# Patient Record
Sex: FEMALE | Race: WHITE | NOT HISPANIC OR LATINO | Employment: PART TIME | URBAN - METROPOLITAN AREA
[De-identification: names, ages, dates, MRNs, and addresses within clinical notes are randomized per-mention and may not be internally consistent; named-entity substitution may affect disease eponyms.]

---

## 2018-06-12 ENCOUNTER — PATIENT MESSAGE (OUTPATIENT)
Dept: ADMINISTRATIVE | Facility: OTHER | Age: 48
End: 2018-06-12

## 2018-06-14 ENCOUNTER — OFFICE VISIT (OUTPATIENT)
Dept: OBSTETRICS AND GYNECOLOGY | Facility: CLINIC | Age: 48
End: 2018-06-14
Payer: COMMERCIAL

## 2018-06-14 VITALS
HEIGHT: 63 IN | WEIGHT: 226.19 LBS | DIASTOLIC BLOOD PRESSURE: 70 MMHG | BODY MASS INDEX: 40.08 KG/M2 | SYSTOLIC BLOOD PRESSURE: 108 MMHG

## 2018-06-14 DIAGNOSIS — Z01.419 WELL WOMAN EXAM WITH ROUTINE GYNECOLOGICAL EXAM: Primary | ICD-10-CM

## 2018-06-14 PROCEDURE — 99999 PR PBB SHADOW E&M-EST. PATIENT-LVL II: CPT | Mod: PBBFAC,,, | Performed by: OBSTETRICS & GYNECOLOGY

## 2018-06-14 PROCEDURE — 99396 PREV VISIT EST AGE 40-64: CPT | Mod: S$GLB,,, | Performed by: OBSTETRICS & GYNECOLOGY

## 2018-06-14 PROCEDURE — 88175 CYTOPATH C/V AUTO FLUID REDO: CPT

## 2018-06-14 RX ORDER — FUROSEMIDE 40 MG/1
TABLET ORAL
COMMUNITY
Start: 2018-05-03 | End: 2020-01-15

## 2018-06-14 RX ORDER — BUPROPION HYDROCHLORIDE 300 MG/1
TABLET ORAL
COMMUNITY
Start: 2018-05-14 | End: 2020-01-15

## 2018-06-14 RX ORDER — METFORMIN HYDROCHLORIDE 500 MG/1
500 TABLET, EXTENDED RELEASE ORAL 2 TIMES DAILY
COMMUNITY
Start: 2018-05-14

## 2018-06-14 RX ORDER — DULOXETIN HYDROCHLORIDE 60 MG/1
CAPSULE, DELAYED RELEASE ORAL
COMMUNITY
Start: 2018-05-14 | End: 2020-01-15

## 2018-06-14 NOTE — PROGRESS NOTES
CHIEF COMPLAINT:   Gynecologic Exam  Chief Complaint   Patient presents with    Well Woman       HISTORY OF PRESENT ILLNESS  Patient presents for annual exam. The patient has no complaints today.   She is sexually active.  Contraception: vasectomy  Brother recently diagnosed with kidney cancer.     No LMP recorded (lmp unknown). Patient has had an ablation.    GYN screening history: last Pap: was normal.     Health Maintenance   Topic Date Due    Lipid Panel  1969    TETANUS VACCINE  04/02/1987    Mammogram  08/24/2017    Pap Smear with HPV Cotest  08/13/2018    Influenza Vaccine  08/01/2018       HISTORY  Patient Active Problem List   Diagnosis    Anemia, blood loss    Cysts of both ovaries    Menorrhagia       Past Medical History:   Diagnosis Date    Anemia     Diabetes mellitus     Hypertension        Past Surgical History:   Procedure Laterality Date    WISDOM TOOTH EXTRACTION         Family History   Problem Relation Age of Onset    Colon cancer Father     Cancer Brother         kidney    Breast cancer Neg Hx     Ovarian cancer Neg Hx        Social History     Social History    Marital status:      Spouse name: N/A    Number of children: N/A    Years of education: N/A     Social History Main Topics    Smoking status: Never Smoker    Smokeless tobacco: None    Alcohol use 0.0 oz/week      Comment: socially No for 72h prior to surgery    Drug use: No    Sexual activity: Yes     Partners: Male     Birth control/ protection: See Surgical Hx      Comment: vasectomy     Other Topics Concern    None     Social History Narrative    None       Current Outpatient Prescriptions   Medication Sig Dispense Refill    amlodipine (NORVASC) 5 MG tablet Take 5 mg by mouth once daily.      atorvastatin (LIPITOR) 20 MG tablet Take 20 mg by mouth every evening. Take 1 tablet by mouth  daily      buPROPion (WELLBUTRIN XL) 300 MG 24 hr tablet       DULoxetine (CYMBALTA) 60 MG capsule        furosemide (LASIX) 40 MG tablet       irbesartan (AVAPRO) 300 MG tablet Take 300 mg by mouth.      metFORMIN (GLUCOPHAGE-XR) 500 MG 24 hr tablet       metformin (GLUMETZA) 1000 MG (MOD) 24 hr tablet Take 1,000 mg by mouth 2 (two) times daily with meals.       metoprolol succinate (TOPROL-XL) 100 MG 24 hr tablet Take 100 mg by mouth every evening.       omeprazole (PRILOSEC) 40 MG capsule Take 40 mg by mouth 2 (two) times daily before meals.        No current facility-administered medications for this visit.        Review of patient's allergies indicates:  No Known Allergies        PHYSICAL EXAM     Vitals:    06/14/18 0859   BP: 108/70       PAIN SCALE: 0/10 None    ROS:  GENERAL: No fever, chills, fatigability or weight loss.  ABDOMEN: Appetite fine. No weight loss. Denies diarrhea, abdominal pain, hematemesis or blood in stool.  No change in bowel movement pattern.  URINARY: No flank pain, dysuria or hematuria.  REPRODUCTIVE: No abnormal vaginal bleeding.  BREASTS: Breasts symmetric, nontender and no lumps detected.    PE:   APPEARANCE: Well nourished, well developed, in no acute distress.  NECK: Neck symmetric without masses or thyromegaly.    NODES: No inguinal lymph node enlargement.  ABDOMEN: Soft. No tenderness or masses. No hepatosplenomegaly. No hernias.  BREASTS: Symmetrical, no skin changes or visible lesions. No palpable masses, nipple discharge or adenopathy bilaterally.    PELVIC:   VULVA: No lesions. Normal female genitalia.  URETHRAL MEATUS: Normal size and location, no lesions, no prolapse.  URETHRA: No masses, tenderness, prolapse or scarring.  VAGINA: Moist and well rugated, no discharge, no significant cystocele or rectocele.  CERVIX: No lesions, normal diameter, no stenosis, no cervical motion tenderness.  UTERUS: 8 week size, regular shape, mobile, non-tender, normal position, good support.  ADNEXA: No masses, tenderness or CDS nodularity.  ANUS PERINEUM: No lesions, no relaxation,  external hemorrhoids noted.         DIAGNOSIS:   1. Normal gyn exam  2. Screening pap smear      PLAN:   Mammogram     MEDICATIONS PRESCRIBED:   mvi    COUNSELING:  Patient was counseled today on A.C.S. Pap guidelines and recommendations for yearly pelvic exams, mammograms and monthly self breast exams; to see her PCP for other health maintenance.     FOLLOW-UP: With me in 1 year. Pap smear every 3 years.

## 2018-07-02 ENCOUNTER — HOSPITAL ENCOUNTER (OUTPATIENT)
Dept: RADIOLOGY | Facility: HOSPITAL | Age: 48
Discharge: HOME | End: 2018-07-02
Attending: OBSTETRICS & GYNECOLOGY
Payer: COMMERCIAL

## 2018-07-02 DIAGNOSIS — Z01.419 WELL WOMAN EXAM WITH ROUTINE GYNECOLOGICAL EXAM: ICD-10-CM

## 2018-07-02 PROCEDURE — 77063 BREAST TOMOSYNTHESIS BI: CPT | Mod: TC,PO

## 2018-07-02 PROCEDURE — 77067 SCR MAMMO BI INCL CAD: CPT | Mod: 26,,, | Performed by: RADIOLOGY

## 2018-07-02 PROCEDURE — 77063 BREAST TOMOSYNTHESIS BI: CPT | Mod: 26,,, | Performed by: RADIOLOGY

## 2019-09-03 ENCOUNTER — PATIENT MESSAGE (OUTPATIENT)
Dept: OBSTETRICS AND GYNECOLOGY | Facility: CLINIC | Age: 49
End: 2019-09-03

## 2019-09-03 DIAGNOSIS — Z12.39 BREAST CANCER SCREENING: Primary | ICD-10-CM

## 2019-10-03 ENCOUNTER — HOSPITAL ENCOUNTER (OUTPATIENT)
Dept: RADIOLOGY | Facility: HOSPITAL | Age: 49
Discharge: HOME | End: 2019-10-03
Attending: OBSTETRICS & GYNECOLOGY
Payer: COMMERCIAL

## 2019-10-03 VITALS — BODY MASS INDEX: 40.08 KG/M2 | HEIGHT: 63 IN | WEIGHT: 226.19 LBS

## 2019-10-03 DIAGNOSIS — Z12.39 BREAST CANCER SCREENING: ICD-10-CM

## 2019-10-03 PROCEDURE — 77067 SCR MAMMO BI INCL CAD: CPT | Mod: 26,,, | Performed by: RADIOLOGY

## 2019-10-03 PROCEDURE — 77063 BREAST TOMOSYNTHESIS BI: CPT | Mod: TC

## 2019-10-03 PROCEDURE — 77063 MAMMO DIGITAL SCREENING BILAT WITH TOMOSYNTHESIS_CAD: ICD-10-PCS | Mod: 26,,, | Performed by: RADIOLOGY

## 2019-10-03 PROCEDURE — 77063 BREAST TOMOSYNTHESIS BI: CPT | Mod: 26,,, | Performed by: RADIOLOGY

## 2019-10-03 PROCEDURE — 77067 MAMMO DIGITAL SCREENING BILAT WITH TOMOSYNTHESIS_CAD: ICD-10-PCS | Mod: 26,,, | Performed by: RADIOLOGY

## 2019-10-31 ENCOUNTER — OFFICE VISIT (OUTPATIENT)
Dept: OBSTETRICS AND GYNECOLOGY | Facility: CLINIC | Age: 49
End: 2019-10-31
Payer: COMMERCIAL

## 2019-10-31 VITALS
SYSTOLIC BLOOD PRESSURE: 118 MMHG | WEIGHT: 237 LBS | BODY MASS INDEX: 41.99 KG/M2 | DIASTOLIC BLOOD PRESSURE: 78 MMHG | HEIGHT: 63 IN

## 2019-10-31 DIAGNOSIS — Z01.419 WELL WOMAN EXAM WITH ROUTINE GYNECOLOGICAL EXAM: Primary | ICD-10-CM

## 2019-10-31 PROCEDURE — 99396 PREV VISIT EST AGE 40-64: CPT | Mod: S$GLB,,, | Performed by: OBSTETRICS & GYNECOLOGY

## 2019-10-31 PROCEDURE — 99999 PR PBB SHADOW E&M-EST. PATIENT-LVL II: ICD-10-PCS | Mod: PBBFAC,,, | Performed by: OBSTETRICS & GYNECOLOGY

## 2019-10-31 PROCEDURE — 99396 PR PREVENTIVE VISIT,EST,40-64: ICD-10-PCS | Mod: S$GLB,,, | Performed by: OBSTETRICS & GYNECOLOGY

## 2019-10-31 PROCEDURE — 99999 PR PBB SHADOW E&M-EST. PATIENT-LVL II: CPT | Mod: PBBFAC,,, | Performed by: OBSTETRICS & GYNECOLOGY

## 2019-10-31 RX ORDER — OMEPRAZOLE 40 MG/1
40 CAPSULE, DELAYED RELEASE ORAL
COMMUNITY
Start: 2019-07-01 | End: 2019-10-31 | Stop reason: SDUPTHER

## 2019-10-31 RX ORDER — ATORVASTATIN CALCIUM 20 MG/1
20 TABLET, FILM COATED ORAL
COMMUNITY
Start: 2019-07-01 | End: 2019-10-31 | Stop reason: SDUPTHER

## 2019-10-31 RX ORDER — LANCETS
EACH MISCELLANEOUS
COMMUNITY
Start: 2019-04-02

## 2019-10-31 RX ORDER — IRBESARTAN 300 MG/1
TABLET ORAL
COMMUNITY
Start: 2019-08-23 | End: 2019-10-31 | Stop reason: SDUPTHER

## 2019-10-31 RX ORDER — METFORMIN HYDROCHLORIDE 500 MG/1
TABLET, EXTENDED RELEASE ORAL
COMMUNITY
Start: 2019-10-28 | End: 2019-10-31 | Stop reason: SDUPTHER

## 2019-10-31 RX ORDER — BUPROPION HYDROCHLORIDE 300 MG/1
300 TABLET ORAL
COMMUNITY
Start: 2019-07-29 | End: 2019-10-31 | Stop reason: SDUPTHER

## 2019-10-31 RX ORDER — IRBESARTAN 300 MG/1
300 TABLET ORAL
COMMUNITY
Start: 2019-02-18 | End: 2020-01-15

## 2019-10-31 RX ORDER — FUROSEMIDE 40 MG/1
40 TABLET ORAL
COMMUNITY
Start: 2019-07-29 | End: 2019-10-31 | Stop reason: SDUPTHER

## 2019-10-31 RX ORDER — DULOXETIN HYDROCHLORIDE 60 MG/1
60 CAPSULE, DELAYED RELEASE ORAL
COMMUNITY
Start: 2019-07-01 | End: 2019-10-31 | Stop reason: SDUPTHER

## 2019-10-31 RX ORDER — AMLODIPINE BESYLATE 5 MG/1
5 TABLET ORAL
COMMUNITY
Start: 2019-07-08 | End: 2019-10-31 | Stop reason: SDUPTHER

## 2019-10-31 RX ORDER — METOPROLOL SUCCINATE 100 MG/1
100 TABLET, EXTENDED RELEASE ORAL
COMMUNITY
Start: 2019-07-11 | End: 2019-10-31 | Stop reason: SDUPTHER

## 2019-10-31 RX ORDER — DEXTROSE 4 G
TABLET,CHEWABLE ORAL
COMMUNITY
Start: 2019-01-16

## 2019-10-31 NOTE — PROGRESS NOTES
CHIEF COMPLAINT:   Gynecologic Exam  No chief complaint on file.      HISTORY OF PRESENT ILLNESS  Ania Liao   presents for annual exam. The patient has no complaints today.   She is sexually active.  Contraception: vasectomy  Brother diagnosed with kidney cancer last yr.  .     No LMP recorded. Patient has had an ablation.   has occ spotting.     GYN screening history: last Pap: was normal. Never had any abnormal Pap smears in past.     Reports no bowel movement irregularities from baseline, bloating, or weight loss.   HRT:   None       Health Maintenance   Topic Date Due    TETANUS VACCINE  1987    Colonoscopy  2019    Pap Smear with HPV Cotest  2021    Mammogram  10/03/2021    Lipid Panel  2024       HISTORY  Patient Active Problem List   Diagnosis    Anemia, blood loss    Cysts of both ovaries    Menorrhagia       Past Medical History:   Diagnosis Date    Anemia     Diabetes mellitus     Hypertension        Past Surgical History:   Procedure Laterality Date    WISDOM TOOTH EXTRACTION         Family History   Problem Relation Age of Onset    Colon cancer Father     Cancer Brother         kidney    Breast cancer Neg Hx     Ovarian cancer Neg Hx        Social History     Socioeconomic History    Marital status:      Spouse name: Not on file    Number of children: Not on file    Years of education: Not on file    Highest education level: Not on file   Occupational History    Not on file   Social Needs    Financial resource strain: Not on file    Food insecurity:     Worry: Not on file     Inability: Not on file    Transportation needs:     Medical: Not on file     Non-medical: Not on file   Tobacco Use    Smoking status: Never Smoker   Substance and Sexual Activity    Alcohol use: Yes     Alcohol/week: 0.0 standard drinks     Comment: socially No for 72h prior to surgery    Drug use: No    Sexual activity: Yes     Partners: Male      Birth control/protection: See Surgical Hx     Comment: vasectomy   Lifestyle    Physical activity:     Days per week: Not on file     Minutes per session: Not on file    Stress: Not on file   Relationships    Social connections:     Talks on phone: Not on file     Gets together: Not on file     Attends Confucianism service: Not on file     Active member of club or organization: Not on file     Attends meetings of clubs or organizations: Not on file     Relationship status: Not on file   Other Topics Concern    Not on file   Social History Narrative    Not on file       Current Outpatient Medications   Medication Sig Dispense Refill    amlodipine (NORVASC) 5 MG tablet Take 5 mg by mouth once daily.      atorvastatin (LIPITOR) 20 MG tablet Take 20 mg by mouth every evening. Take 1 tablet by mouth  daily      blood-glucose meter Misc Test daily      buPROPion (WELLBUTRIN XL) 300 MG 24 hr tablet       DULoxetine (CYMBALTA) 60 MG capsule       flu vac [id number] 36mos up,PF, 60 mcg (15 mcg x 4)/0.5 mL Syrg Inject into the muscle once 0.5 mL 0    furosemide (LASIX) 40 MG tablet       irbesartan (AVAPRO) 300 MG tablet Take 300 mg by mouth.      lancets Misc Check blood sugar once a day      metFORMIN (GLUCOPHAGE-XR) 500 MG 24 hr tablet       metformin (GLUMETZA) 1000 MG (MOD) 24 hr tablet Take 1,000 mg by mouth 2 (two) times daily with meals.       metoprolol succinate (TOPROL-XL) 100 MG 24 hr tablet Take 100 mg by mouth every evening.       omeprazole (PRILOSEC) 40 MG capsule Take 40 mg by mouth 2 (two) times daily before meals.        No current facility-administered medications for this visit.        Review of patient's allergies indicates:  No Known Allergies        PHYSICAL EXAM     Vitals:    10/31/19 0845   BP: 118/78       PAIN SCALE: 0/10 None    ROS:  GENERAL: No fever, chills, fatigability or weight loss.  ABDOMEN: Appetite fine. No weight loss. Denies diarrhea, abdominal pain, hematemesis or blood  in stool.  No change in bowel movement pattern.  URINARY: No flank pain, dysuria or hematuria.  REPRODUCTIVE: No abnormal vaginal bleeding.  BREASTS: Breasts symmetric, nontender and no lumps detected.    PE:   APPEARANCE: Well nourished, well developed, in no acute distress.  NECK: Neck symmetric without masses or thyromegaly.     NODES: No inguinal lymph node enlargement.  ABDOMEN: Soft. No tenderness or masses. No hepatosplenomegaly. No hernias.  BREASTS: Symmetrical, no skin changes or visible lesions. No palpable masses, nipple discharge or adenopathy bilaterally.    PELVIC:   VULVA: No lesions. Normal female genitalia.  URETHRAL MEATUS: Normal size and location, no lesions, no prolapse.  URETHRA: No masses, tenderness, prolapse or scarring.  VAGINA: Moist and well rugated, no discharge, no significant cystocele or rectocele.  CERVIX: No lesions, normal diameter, no stenosis, no cervical motion tenderness.  UTERUS: 8 week size, regular shape, mobile, non-tender, normal position, good support.  ADNEXA: No masses, tenderness or CDS nodularity.  ANUS PERINEUM: No lesions, no relaxation, external hemorrhoids noted.       DIAGNOSIS:   1. Normal gyn exam  2. Screening pap smear      PLAN:   Mammogram    Colonoscopy      MEDICATIONS PRESCRIBED:   Calcium vitamin D and weight bearing exercise    COUNSELING:  Patient was counseled today on A.C.S. Pap guidelines and recommendations for yearly pelvic exams, mammograms and monthly self breast exams; to see her PCP for other health maintenance.   Pt counseled on signs and symptoms of cancer of the pelvic organs including ovarian and uterine, and to alert myself and my office if pt has any vaginal bleeding, pelvic/abdominal pain, persistent bloating, unexplained constipation, unexplained appetite and/or weight loss.     FOLLOW-UP: With me in 1 year. Pap smear every 3 years.

## 2019-11-12 ENCOUNTER — OFFICE VISIT (OUTPATIENT)
Dept: DERMATOLOGY | Facility: CLINIC | Age: 49
End: 2019-11-12
Payer: COMMERCIAL

## 2019-11-12 DIAGNOSIS — L82.1 SEBORRHEIC KERATOSES: ICD-10-CM

## 2019-11-12 DIAGNOSIS — D18.01 ANGIOMA OF SKIN: ICD-10-CM

## 2019-11-12 DIAGNOSIS — L82.0 INFLAMED SEBORRHEIC KERATOSIS: Primary | ICD-10-CM

## 2019-11-12 DIAGNOSIS — Z12.83 SCREENING, MALIGNANT NEOPLASM, SKIN: ICD-10-CM

## 2019-11-12 PROCEDURE — 99202 PR OFFICE/OUTPT VISIT, NEW, LEVL II, 15-29 MIN: ICD-10-PCS | Mod: 25,S$GLB,, | Performed by: STUDENT IN AN ORGANIZED HEALTH CARE EDUCATION/TRAINING PROGRAM

## 2019-11-12 PROCEDURE — 17110 DESTRUCTION B9 LES UP TO 14: CPT | Mod: S$GLB,,, | Performed by: STUDENT IN AN ORGANIZED HEALTH CARE EDUCATION/TRAINING PROGRAM

## 2019-11-12 PROCEDURE — 17110 PR DESTRUCTION BENIGN LESIONS UP TO 14: ICD-10-PCS | Mod: S$GLB,,, | Performed by: STUDENT IN AN ORGANIZED HEALTH CARE EDUCATION/TRAINING PROGRAM

## 2019-11-12 PROCEDURE — 99999 PR PBB SHADOW E&M-EST. PATIENT-LVL II: ICD-10-PCS | Mod: PBBFAC,,, | Performed by: STUDENT IN AN ORGANIZED HEALTH CARE EDUCATION/TRAINING PROGRAM

## 2019-11-12 PROCEDURE — 99999 PR PBB SHADOW E&M-EST. PATIENT-LVL II: CPT | Mod: PBBFAC,,, | Performed by: STUDENT IN AN ORGANIZED HEALTH CARE EDUCATION/TRAINING PROGRAM

## 2019-11-12 PROCEDURE — 99202 OFFICE O/P NEW SF 15 MIN: CPT | Mod: 25,S$GLB,, | Performed by: STUDENT IN AN ORGANIZED HEALTH CARE EDUCATION/TRAINING PROGRAM

## 2019-11-12 NOTE — PATIENT INSTRUCTIONS

## 2019-11-12 NOTE — PROGRESS NOTES
Subjective:       Patient ID:  Ania Liao is a 49 y.o. female who presents for   Chief Complaint   Patient presents with    Skin Check     History of Present Illness: The patient presents with chief complaint of a skin lesion  Location: right arm, scalp and face  Duration: 1 year  Signs/Symptoms: growing, itchy  Prior treatments: off brand compound W  Denies any history of skin cancer.       Review of Systems   Skin: Negative for itching, rash, dry skin, daily sunscreen use and activity-related sunscreen use.        Objective:    Physical Exam   Constitutional: She appears well-developed and well-nourished. No distress.   Neurological: She is alert and oriented to person, place, and time. She is not disoriented.   Psychiatric: She has a normal mood and affect.   Skin:   Areas Examined (abnormalities noted in diagram):   Scalp / Hair Palpated and Inspected  Head / Face Inspection Performed  Neck Inspection Performed  Chest / Axilla Inspection Performed  Abdomen Inspection Performed  Back Inspection Performed  RUE Inspected  LUE Inspection Performed  RLE Inspected  LLE Inspection Performed                   Diagram Legend     Erythematous scaling macule/papule c/w actinic keratosis       Vascular papule c/w angioma      Pigmented verrucoid papule/plaque c/w seborrheic keratosis      Yellow umbilicated papule c/w sebaceous hyperplasia      Irregularly shaped tan macule c/w lentigo     1-2 mm smooth white papules consistent with Milia      Movable subcutaneous cyst with punctum c/w epidermal inclusion cyst      Subcutaneous movable cyst c/w pilar cyst      Firm pink to brown papule c/w dermatofibroma      Pedunculated fleshy papule(s) c/w skin tag(s)      Evenly pigmented macule c/w junctional nevus     Mildly variegated pigmented, slightly irregular-bordered macule c/w mildly atypical nevus      Flesh colored to evenly pigmented papule c/w intradermal nevus       Pink pearly papule/plaque c/w basal  cell carcinoma      Erythematous hyperkeratotic cursted plaque c/w SCC      Surgical scar with no sign of skin cancer recurrence      Open and closed comedones      Inflammatory papules and pustules      Verrucoid papule consistent consistent with wart     Erythematous eczematous patches and plaques     Dystrophic onycholytic nail with subungual debris c/w onychomycosis     Umbilicated papule    Erythematous-base heme-crusted tan verrucoid plaque consistent with inflamed seborrheic keratosis     Erythematous Silvery Scaling Plaque c/w Psoriasis     See annotation      Assessment / Plan:        Inflamed seborrheic keratosis  Cryosurgery procedure note:    Verbal consent from the patient is obtained including, but not limited to, risk of hypopigmentation/hyperpigmentation, scar, recurrence of lesion. Liquid nitrogen cryosurgery is applied to 3 lesions to produce a freeze injury. The patient is aware that blisters may form and is instructed on wound care with gentle cleansing and use of vaseline ointment to keep moist until healed. The patient is supplied a handout on cryosurgery and is instructed to call if lesions do not completely resolve.    Seborrheic keratoses  These are benign inherited growths without a malignant potential. Reassurance given to patient. No treatment is necessary.     Angioma of skin  This is a benign vascular lesion. Reassurance given. No treatment required.     Screening, malignant neoplasm, skin  Upper and lower body skin examination performed today including at least 10 points as noted in physical examination. No lesions suspicious for malignancy noted.  Reassurance provided.  Instructed patient to observe lesion(s) for changes and follow up in clinic if changes are noted. Discussed ABCDE's of moles and brochure provided.             Follow up in about 1 year (around 11/12/2020).

## 2019-11-19 ENCOUNTER — PROCEDURE VISIT (OUTPATIENT)
Dept: DERMATOLOGY | Facility: CLINIC | Age: 49
End: 2019-11-19
Payer: COMMERCIAL

## 2019-11-19 DIAGNOSIS — L91.8 SKIN TAG: Primary | ICD-10-CM

## 2019-11-19 DIAGNOSIS — L70.0 ACNE VULGARIS: ICD-10-CM

## 2019-11-19 PROCEDURE — 17110 PR DESTRUCTION BENIGN LESIONS UP TO 14: ICD-10-PCS | Mod: CSM,S$GLB,, | Performed by: STUDENT IN AN ORGANIZED HEALTH CARE EDUCATION/TRAINING PROGRAM

## 2019-11-19 PROCEDURE — 99499 NO LOS: ICD-10-PCS | Mod: ,,, | Performed by: STUDENT IN AN ORGANIZED HEALTH CARE EDUCATION/TRAINING PROGRAM

## 2019-11-19 PROCEDURE — 17110 DESTRUCTION B9 LES UP TO 14: CPT | Mod: CSM,S$GLB,, | Performed by: STUDENT IN AN ORGANIZED HEALTH CARE EDUCATION/TRAINING PROGRAM

## 2019-11-19 PROCEDURE — 99499 UNLISTED E&M SERVICE: CPT | Mod: ,,, | Performed by: STUDENT IN AN ORGANIZED HEALTH CARE EDUCATION/TRAINING PROGRAM

## 2019-11-19 RX ORDER — TRETINOIN 0.5 MG/G
CREAM TOPICAL NIGHTLY
Qty: 45 G | Refills: 4 | Status: SHIPPED | OUTPATIENT
Start: 2019-11-19 | End: 2020-01-02 | Stop reason: SDUPTHER

## 2019-11-19 NOTE — PATIENT INSTRUCTIONS

## 2019-11-19 NOTE — PROGRESS NOTES
Verbal consent obtained. 6 lesions removed with scissor snip removal after anesthesia with 1% lidocaine with epinephrine. Hemostasis achieved with aluminum chloride and hyfrecation. No complications.

## 2019-12-26 ENCOUNTER — TELEPHONE (OUTPATIENT)
Dept: DERMATOLOGY | Facility: CLINIC | Age: 49
End: 2019-12-26

## 2019-12-26 DIAGNOSIS — L70.0 ACNE VULGARIS: ICD-10-CM

## 2020-01-02 RX ORDER — TRETINOIN 0.5 MG/G
CREAM TOPICAL NIGHTLY
Qty: 45 G | Refills: 4 | Status: SHIPPED | OUTPATIENT
Start: 2020-01-02 | End: 2020-12-17

## 2020-01-15 ENCOUNTER — OFFICE VISIT (OUTPATIENT)
Dept: DERMATOLOGY | Facility: CLINIC | Age: 50
End: 2020-01-15
Payer: COMMERCIAL

## 2020-01-15 DIAGNOSIS — L30.9 DERMATITIS: Primary | ICD-10-CM

## 2020-01-15 PROCEDURE — 99213 OFFICE O/P EST LOW 20 MIN: CPT | Mod: S$GLB,,, | Performed by: STUDENT IN AN ORGANIZED HEALTH CARE EDUCATION/TRAINING PROGRAM

## 2020-01-15 PROCEDURE — 99213 PR OFFICE/OUTPT VISIT, EST, LEVL III, 20-29 MIN: ICD-10-PCS | Mod: S$GLB,,, | Performed by: STUDENT IN AN ORGANIZED HEALTH CARE EDUCATION/TRAINING PROGRAM

## 2020-01-15 PROCEDURE — 99999 PR PBB SHADOW E&M-EST. PATIENT-LVL II: ICD-10-PCS | Mod: PBBFAC,,, | Performed by: STUDENT IN AN ORGANIZED HEALTH CARE EDUCATION/TRAINING PROGRAM

## 2020-01-15 PROCEDURE — 99999 PR PBB SHADOW E&M-EST. PATIENT-LVL II: CPT | Mod: PBBFAC,,, | Performed by: STUDENT IN AN ORGANIZED HEALTH CARE EDUCATION/TRAINING PROGRAM

## 2020-01-15 RX ORDER — BUPROPION HYDROCHLORIDE 300 MG/1
300 TABLET ORAL
COMMUNITY
Start: 2019-12-17 | End: 2020-12-16

## 2020-01-15 RX ORDER — TRIAMCINOLONE ACETONIDE 0.25 MG/G
CREAM TOPICAL
Qty: 80 G | Refills: 0 | Status: SHIPPED | OUTPATIENT
Start: 2020-01-15 | End: 2020-12-17

## 2020-01-15 RX ORDER — FUROSEMIDE 40 MG/1
40 TABLET ORAL
COMMUNITY
Start: 2019-12-17

## 2020-01-15 RX ORDER — ATORVASTATIN CALCIUM 20 MG/1
20 TABLET, FILM COATED ORAL
COMMUNITY
Start: 2019-12-23

## 2020-01-15 RX ORDER — DULOXETIN HYDROCHLORIDE 60 MG/1
60 CAPSULE, DELAYED RELEASE ORAL
COMMUNITY
Start: 2019-12-23

## 2020-01-15 RX ORDER — IRBESARTAN 300 MG/1
300 TABLET ORAL
COMMUNITY
Start: 2019-12-23

## 2020-01-15 RX ORDER — METOPROLOL SUCCINATE 100 MG/1
100 TABLET, EXTENDED RELEASE ORAL
COMMUNITY
Start: 2019-11-29

## 2020-01-15 NOTE — PROGRESS NOTES
Subjective:       Patient ID:  Ania Liao is a 49 y.o. female who presents for   Chief Complaint   Patient presents with    Rash     History of Present Illness: The patient presents with chief complaint of rash.  Location: anterior neck and along eyelids  Duration: 1-2 weeks  Signs/Symptoms: redness, itching  Prior treatments: OTC creams. Unsure if she has used new jewelry, makeup, or other new exposures.         Review of Systems   Skin: Positive for itching.        Objective:    Physical Exam   Constitutional: She appears well-developed and well-nourished. No distress.   Neurological: She is alert and oriented to person, place, and time. She is not disoriented.   Psychiatric: She has a normal mood and affect.   Skin:   Areas Examined (abnormalities noted in diagram):   Head / Face Inspection Performed  Neck Inspection Performed              Diagram Legend     Erythematous scaling macule/papule c/w actinic keratosis       Vascular papule c/w angioma      Pigmented verrucoid papule/plaque c/w seborrheic keratosis      Yellow umbilicated papule c/w sebaceous hyperplasia      Irregularly shaped tan macule c/w lentigo     1-2 mm smooth white papules consistent with Milia      Movable subcutaneous cyst with punctum c/w epidermal inclusion cyst      Subcutaneous movable cyst c/w pilar cyst      Firm pink to brown papule c/w dermatofibroma      Pedunculated fleshy papule(s) c/w skin tag(s)      Evenly pigmented macule c/w junctional nevus     Mildly variegated pigmented, slightly irregular-bordered macule c/w mildly atypical nevus      Flesh colored to evenly pigmented papule c/w intradermal nevus       Pink pearly papule/plaque c/w basal cell carcinoma      Erythematous hyperkeratotic cursted plaque c/w SCC      Surgical scar with no sign of skin cancer recurrence      Open and closed comedones      Inflammatory papules and pustules      Verrucoid papule consistent consistent with wart     Erythematous  eczematous patches and plaques     Dystrophic onycholytic nail with subungual debris c/w onychomycosis     Umbilicated papule    Erythematous-base heme-crusted tan verrucoid plaque consistent with inflamed seborrheic keratosis     Erythematous Silvery Scaling Plaque c/w Psoriasis     See annotation      Assessment / Plan:        Dermatitis  -     triamcinolone acetonide 0.025% (KENALOG) 0.025 % cream; AAA bid  Dispense: 80 g; Refill: 0  -     Counseled patient on gentle skin care regimen, including need for sensitive soaps/detergents, as well as need for frequent use of sensitize moisturizers.                Follow up in about 6 weeks (around 2/26/2020).

## 2020-01-15 NOTE — PATIENT INSTRUCTIONS
XEROSIS (DRY SKIN)        1. Definition    Xerosis is the term for dry skin.  We all have a natural oil coating over our skin produced by the skin oil glands.  If this oil is removed, the skin becomes dry which can lead to cracking, which can lead to inflammation.  Xerosis is usually a long-term problem that recurs often, especially in the winter.    2. Cause     Long hot baths or showers can remove our natural oil and lead to xerosis.  One should never take more than one bath or shower a day and for no longer than ten minutes.   Use of harsh soaps such as Zest, Dial, and Ivory can worsen and cause xerosis.   Cold winter weather worsens xerosis because the amount of moisture contained in cold air is much less than the amount of moisture in warm air.    3. Treatment     Treatment is intended to restore the natural oil to your skin.  Keep the skin lubricated.     Do not take more than one bath or shower a day.  Use lukewarm water, not hot.  Hot water dries out the skin.     Use a gentle moisturizing soap such as Cetaphil soap, Oil of Olay, Dove, Basis, Ivory moisture care, Restoraderm cleanser.     When toweling dry, dont rub.  Blot the skin so there is still some water left on the skin.  You should apply a moisturizing cream to all of the skin such as Cerave cream, Cetaphil cream, Restoraderm or Eucerin Original Formula cream.   Alpha hydroxyacid lotions, i.e., AmLactin, also work very well for preventing dry skin, but may burn when used on inflamed or reddened skin.     If you like to swim during the winter months, you should not use soap when getting out of the pool.  When you have finished swimming, rinse off the chlorine with cool to warm water.  If this will be the only shower of the day, then you may use Cetaphil or another mild soap to cleanse your skin.  After the shower, apply a moisturizing cream to all of the skin as above.  \

## 2020-10-15 DIAGNOSIS — Z12.31 BREAST CANCER SCREENING BY MAMMOGRAM: Primary | ICD-10-CM

## 2020-12-17 ENCOUNTER — OFFICE VISIT (OUTPATIENT)
Dept: OBSTETRICS AND GYNECOLOGY | Facility: CLINIC | Age: 50
End: 2020-12-17
Payer: COMMERCIAL

## 2020-12-17 ENCOUNTER — HOSPITAL ENCOUNTER (OUTPATIENT)
Dept: RADIOLOGY | Facility: HOSPITAL | Age: 50
Discharge: HOME | End: 2020-12-17
Attending: OBSTETRICS & GYNECOLOGY
Payer: COMMERCIAL

## 2020-12-17 VITALS
DIASTOLIC BLOOD PRESSURE: 86 MMHG | SYSTOLIC BLOOD PRESSURE: 132 MMHG | HEIGHT: 63 IN | BODY MASS INDEX: 41.99 KG/M2 | BODY MASS INDEX: 40.51 KG/M2 | WEIGHT: 237 LBS | WEIGHT: 228.63 LBS | HEIGHT: 63 IN

## 2020-12-17 DIAGNOSIS — Z12.31 BREAST CANCER SCREENING BY MAMMOGRAM: ICD-10-CM

## 2020-12-17 DIAGNOSIS — Z01.419 WELL WOMAN EXAM WITH ROUTINE GYNECOLOGICAL EXAM: Primary | ICD-10-CM

## 2020-12-17 PROCEDURE — 77067 MAMMO DIGITAL SCREENING BILAT WITH TOMO: ICD-10-PCS | Mod: 26,,, | Performed by: RADIOLOGY

## 2020-12-17 PROCEDURE — 77067 SCR MAMMO BI INCL CAD: CPT | Mod: TC

## 2020-12-17 PROCEDURE — 77067 SCR MAMMO BI INCL CAD: CPT | Mod: 26,,, | Performed by: RADIOLOGY

## 2020-12-17 PROCEDURE — 99999 PR PBB SHADOW E&M-EST. PATIENT-LVL III: CPT | Mod: PBBFAC,,, | Performed by: OBSTETRICS & GYNECOLOGY

## 2020-12-17 PROCEDURE — 3008F BODY MASS INDEX DOCD: CPT | Mod: CPTII,S$GLB,, | Performed by: OBSTETRICS & GYNECOLOGY

## 2020-12-17 PROCEDURE — 99396 PR PREVENTIVE VISIT,EST,40-64: ICD-10-PCS | Mod: S$GLB,,, | Performed by: OBSTETRICS & GYNECOLOGY

## 2020-12-17 PROCEDURE — 77063 BREAST TOMOSYNTHESIS BI: CPT | Mod: 26,,, | Performed by: RADIOLOGY

## 2020-12-17 PROCEDURE — 77063 MAMMO DIGITAL SCREENING BILAT WITH TOMO: ICD-10-PCS | Mod: 26,,, | Performed by: RADIOLOGY

## 2020-12-17 PROCEDURE — 3008F PR BODY MASS INDEX (BMI) DOCUMENTED: ICD-10-PCS | Mod: CPTII,S$GLB,, | Performed by: OBSTETRICS & GYNECOLOGY

## 2020-12-17 PROCEDURE — 99999 PR PBB SHADOW E&M-EST. PATIENT-LVL III: ICD-10-PCS | Mod: PBBFAC,,, | Performed by: OBSTETRICS & GYNECOLOGY

## 2020-12-17 PROCEDURE — 99396 PREV VISIT EST AGE 40-64: CPT | Mod: S$GLB,,, | Performed by: OBSTETRICS & GYNECOLOGY

## 2020-12-17 RX ORDER — BUSPIRONE HYDROCHLORIDE 10 MG/1
10 TABLET ORAL
COMMUNITY
Start: 2020-12-15 | End: 2021-06-13

## 2020-12-17 RX ORDER — ALPRAZOLAM 0.25 MG/1
0.25 TABLET ORAL DAILY PRN
COMMUNITY
Start: 2020-10-21 | End: 2021-10-21

## 2020-12-17 RX ORDER — DOXYCYCLINE 100 MG/1
CAPSULE ORAL
COMMUNITY
Start: 2020-12-15 | End: 2021-03-11

## 2020-12-31 NOTE — PROGRESS NOTES
CHIEF COMPLAINT:   Gynecologic Exam  Chief Complaint   Patient presents with    Annual Exam       HISTORY OF PRESENT ILLNESS  Ania Liao   presents for annual exam. The patient has no complaints today.     She is sexually active.  Contraception: vasectomy  Brother diagnosed with kidney cancer and passed away last yr.       No LMP recorded. Patient has had an ablation.   has occ spotting.           GYN screening history: last Pap: was normal. Never had any abnormal Pap smears in past..    Reports no bowel movement irregularities from baseline, bloating, or weight loss.    HRT:   None     Health Maintenance   Topic Date Due    Hepatitis C Screening  1970    TETANUS VACCINE  1987    Mammogram  2022    Lipid Panel  2025       HISTORY  Patient Active Problem List   Diagnosis    Anemia, blood loss    Cysts of both ovaries    Menorrhagia       Past Medical History:   Diagnosis Date    Anemia     Diabetes mellitus     Hypertension        Past Surgical History:   Procedure Laterality Date    WISDOM TOOTH EXTRACTION         Family History   Problem Relation Age of Onset    Colon cancer Father     Cancer Brother         kidney    Breast cancer Neg Hx     Ovarian cancer Neg Hx        Social History     Socioeconomic History    Marital status:      Spouse name: Not on file    Number of children: Not on file    Years of education: Not on file    Highest education level: Not on file   Occupational History    Not on file   Social Needs    Financial resource strain: Not on file    Food insecurity     Worry: Not on file     Inability: Not on file    Transportation needs     Medical: Not on file     Non-medical: Not on file   Tobacco Use    Smoking status: Never Smoker    Smokeless tobacco: Never Used   Substance and Sexual Activity    Alcohol use: Yes     Alcohol/week: 0.0 standard drinks     Comment: socially No for 72h prior to surgery    Drug use: No  "   Sexual activity: Yes     Partners: Male     Birth control/protection: See Surgical Hx     Comment: vasectomy   Lifestyle    Physical activity     Days per week: Not on file     Minutes per session: Not on file    Stress: Not on file   Relationships    Social connections     Talks on phone: Not on file     Gets together: Not on file     Attends [employer] service: Not on file     Active member of club or organization: Not on file     Attends meetings of clubs or organizations: Not on file     Relationship status: Not on file   Other Topics Concern    Not on file   Social History Narrative    Not on file       Current Outpatient Medications   Medication Sig Dispense Refill    ALPRAZolam (XANAX) 0.25 MG tablet Take 0.25 mg by mouth daily as needed.      amlodipine (NORVASC) 5 MG tablet Take 5 mg by mouth once daily.      atorvastatin (LIPITOR) 20 MG tablet Take 20 mg by mouth.      blood-glucose meter Misc Test daily      busPIRone (BUSPAR) 10 MG tablet Take 10 mg by mouth.      doxycycline (VIBRAMYCIN) 100 MG Cap       DULoxetine (CYMBALTA) 60 MG capsule Take 60 mg by mouth.      furosemide (LASIX) 40 MG tablet Take 40 mg by mouth.      irbesartan (AVAPRO) 300 MG tablet Take 300 mg by mouth.      lancets Misc Check blood sugar once a day      metFORMIN (GLUCOPHAGE-XR) 500 MG 24 hr tablet Take 500 mg by mouth 2 (two) times daily.       metoprolol succinate (TOPROL-XL) 100 MG 24 hr tablet Take 100 mg by mouth.      omeprazole (PRILOSEC) 40 MG capsule Take 40 mg by mouth 2 (two) times daily before meals.       buPROPion (WELLBUTRIN XL) 300 MG 24 hr tablet Take 300 mg by mouth.       No current facility-administered medications for this visit.        Review of patient's allergies indicates:  No Known Allergies        PHYSICAL EXAM     Vitals:    12/17/20 1439   BP: 132/86   Weight: 103.7 kg (228 lb 9.9 oz)   Height: 5' 3" (1.6 m)        PAIN SCALE: 0/10 None    ROS:  GENERAL: No fever, chills, " fatigability or weight loss.  ABDOMEN: Appetite fine. No weight loss. Denies diarrhea, abdominal pain, hematemesis or blood in stool.  No change in bowel movement pattern.  URINARY: No flank pain, dysuria or hematuria.  REPRODUCTIVE: No abnormal vaginal bleeding.  BREASTS: Breasts symmetric, nontender and no lumps detected.    PE:   APPEARANCE: Well nourished, well developed, in no acute distress.  NECK: Neck symmetric without masses or thyromegaly.     NODES: No inguinal lymph node enlargement.  ABDOMEN: Soft. No tenderness or masses.  No hernias.  BREASTS: Symmetrical, no skin changes or visible lesions. No palpable masses, nipple discharge or adenopathy bilaterally.    PELVIC:   VULVA: No lesions. Normal female genitalia.  URETHRAL MEATUS: Normal size and location, no lesions, no prolapse.  URETHRA: No masses, tenderness, prolapse or scarring.  VAGINA: Moist and well rugated, no discharge, no significant cystocele or rectocele.  CERVIX: No lesions, normal diameter, no stenosis, no cervical motion tenderness.  UTERUS: 8 week size, regular shape, mobile, non-tender, normal position, good support.  ADNEXA: No masses, tenderness or CDS nodularity.  ANUS PERINEUM: No lesions, no relaxation, external hemorrhoids noted.        DIAGNOSIS:         1. Well woman exam with routine gynecological exam        PLAN:   No orders of the defined types were placed in this encounter.       MEDICATIONS PRESCRIBED:       Calcium vitamin D and weight bearing exercise reviewed    COUNSELING:  Patient was counseled today on A.C.S. Pap guidelines and recommendations for yearly pelvic exams, mammograms and monthly self breast exams; to see her PCP for other health maintenance.   Pt counseled on signs and symptoms of cancer of the pelvic organs including ovarian and uterine, and to alert myself and my office if pt has any vaginal bleeding, pelvic/abdominal pain, persistent bloating, unexplained constipation, unexplained appetite and/or  weight loss.     FOLLOW-UP: With me in 1 year. Pap smear every 3 years.

## 2021-02-02 ENCOUNTER — PATIENT MESSAGE (OUTPATIENT)
Dept: OBSTETRICS AND GYNECOLOGY | Facility: CLINIC | Age: 51
End: 2021-02-02

## 2021-02-03 ENCOUNTER — TELEPHONE (OUTPATIENT)
Dept: OBSTETRICS AND GYNECOLOGY | Facility: CLINIC | Age: 51
End: 2021-02-03

## 2021-02-03 DIAGNOSIS — N95.0 PMB (POSTMENOPAUSAL BLEEDING): Primary | ICD-10-CM

## 2021-02-09 ENCOUNTER — TELEPHONE (OUTPATIENT)
Dept: RADIOLOGY | Facility: HOSPITAL | Age: 51
End: 2021-02-09

## 2021-02-10 ENCOUNTER — HOSPITAL ENCOUNTER (OUTPATIENT)
Dept: RADIOLOGY | Facility: HOSPITAL | Age: 51
Discharge: HOME | End: 2021-02-10
Attending: OBSTETRICS & GYNECOLOGY
Payer: COMMERCIAL

## 2021-02-10 DIAGNOSIS — N95.0 PMB (POSTMENOPAUSAL BLEEDING): ICD-10-CM

## 2021-02-10 PROCEDURE — 76830 TRANSVAGINAL US NON-OB: CPT | Mod: 26,,, | Performed by: RADIOLOGY

## 2021-02-10 PROCEDURE — 76856 US EXAM PELVIC COMPLETE: CPT | Mod: 26,,, | Performed by: RADIOLOGY

## 2021-02-10 PROCEDURE — 76830 US PELVIS COMP WITH TRANSVAG NON-OB (XPD): ICD-10-PCS | Mod: 26,,, | Performed by: RADIOLOGY

## 2021-02-10 PROCEDURE — 76856 US EXAM PELVIC COMPLETE: CPT | Mod: TC

## 2021-02-10 PROCEDURE — 76856 US PELVIS COMP WITH TRANSVAG NON-OB (XPD): ICD-10-PCS | Mod: 26,,, | Performed by: RADIOLOGY

## 2021-02-11 DIAGNOSIS — Z71.9 HEALTH EDUCATION/COUNSELING: Primary | ICD-10-CM

## 2021-02-13 ENCOUNTER — PATIENT MESSAGE (OUTPATIENT)
Dept: OBSTETRICS AND GYNECOLOGY | Facility: CLINIC | Age: 51
End: 2021-02-13

## 2021-03-11 ENCOUNTER — OFFICE VISIT (OUTPATIENT)
Dept: OBSTETRICS AND GYNECOLOGY | Facility: CLINIC | Age: 51
End: 2021-03-11
Payer: COMMERCIAL

## 2021-03-11 VITALS
WEIGHT: 226.63 LBS | BODY MASS INDEX: 40.16 KG/M2 | HEIGHT: 63 IN | SYSTOLIC BLOOD PRESSURE: 124 MMHG | DIASTOLIC BLOOD PRESSURE: 70 MMHG

## 2021-03-11 DIAGNOSIS — N80.03 ADENOMYOSIS: ICD-10-CM

## 2021-03-11 DIAGNOSIS — N83.202 CYST OF LEFT OVARY: ICD-10-CM

## 2021-03-11 DIAGNOSIS — N92.6 IRREGULAR MENSES: ICD-10-CM

## 2021-03-11 DIAGNOSIS — N83.202 LEFT OVARIAN CYST: Primary | ICD-10-CM

## 2021-03-11 PROCEDURE — 3008F BODY MASS INDEX DOCD: CPT | Mod: CPTII,S$GLB,, | Performed by: OBSTETRICS & GYNECOLOGY

## 2021-03-11 PROCEDURE — 99999 PR PBB SHADOW E&M-EST. PATIENT-LVL III: ICD-10-PCS | Mod: PBBFAC,,, | Performed by: OBSTETRICS & GYNECOLOGY

## 2021-03-11 PROCEDURE — 1126F PR PAIN SEVERITY QUANTIFIED, NO PAIN PRESENT: ICD-10-PCS | Mod: S$GLB,,, | Performed by: OBSTETRICS & GYNECOLOGY

## 2021-03-11 PROCEDURE — 99213 PR OFFICE/OUTPT VISIT, EST, LEVL III, 20-29 MIN: ICD-10-PCS | Mod: S$GLB,,, | Performed by: OBSTETRICS & GYNECOLOGY

## 2021-03-11 PROCEDURE — 1126F AMNT PAIN NOTED NONE PRSNT: CPT | Mod: S$GLB,,, | Performed by: OBSTETRICS & GYNECOLOGY

## 2021-03-11 PROCEDURE — 99999 PR PBB SHADOW E&M-EST. PATIENT-LVL III: CPT | Mod: PBBFAC,,, | Performed by: OBSTETRICS & GYNECOLOGY

## 2021-03-11 PROCEDURE — 99213 OFFICE O/P EST LOW 20 MIN: CPT | Mod: S$GLB,,, | Performed by: OBSTETRICS & GYNECOLOGY

## 2021-03-11 PROCEDURE — 3008F PR BODY MASS INDEX (BMI) DOCUMENTED: ICD-10-PCS | Mod: CPTII,S$GLB,, | Performed by: OBSTETRICS & GYNECOLOGY

## 2021-03-18 DIAGNOSIS — Z71.9 HEALTH EDUCATION/COUNSELING: Primary | ICD-10-CM

## 2021-04-21 ENCOUNTER — PATIENT MESSAGE (OUTPATIENT)
Dept: OBSTETRICS AND GYNECOLOGY | Facility: CLINIC | Age: 51
End: 2021-04-21

## 2021-04-21 ENCOUNTER — TELEPHONE (OUTPATIENT)
Dept: OBSTETRICS AND GYNECOLOGY | Facility: HOSPITAL | Age: 51
End: 2021-04-21

## 2021-05-10 ENCOUNTER — TELEPHONE (OUTPATIENT)
Dept: RADIOLOGY | Facility: HOSPITAL | Age: 51
End: 2021-05-10

## 2021-05-11 ENCOUNTER — PATIENT MESSAGE (OUTPATIENT)
Dept: OBSTETRICS AND GYNECOLOGY | Facility: CLINIC | Age: 51
End: 2021-05-11

## 2021-05-11 ENCOUNTER — HOSPITAL ENCOUNTER (OUTPATIENT)
Dept: RADIOLOGY | Facility: HOSPITAL | Age: 51
Discharge: HOME | End: 2021-05-11
Attending: OBSTETRICS & GYNECOLOGY
Payer: COMMERCIAL

## 2021-05-11 DIAGNOSIS — N83.202 LEFT OVARIAN CYST: ICD-10-CM

## 2021-05-11 PROCEDURE — 76830 TRANSVAGINAL US NON-OB: CPT | Mod: 26,,, | Performed by: RADIOLOGY

## 2021-05-11 PROCEDURE — 76830 US PELVIS COMP WITH TRANSVAG NON-OB (XPD): ICD-10-PCS | Mod: 26,,, | Performed by: RADIOLOGY

## 2021-05-11 PROCEDURE — 76856 US EXAM PELVIC COMPLETE: CPT | Mod: 26,,, | Performed by: RADIOLOGY

## 2021-05-11 PROCEDURE — 76856 US EXAM PELVIC COMPLETE: CPT | Mod: TC

## 2021-05-11 PROCEDURE — 76856 US PELVIS COMP WITH TRANSVAG NON-OB (XPD): ICD-10-PCS | Mod: 26,,, | Performed by: RADIOLOGY

## 2021-05-20 ENCOUNTER — OFFICE VISIT (OUTPATIENT)
Dept: OBSTETRICS AND GYNECOLOGY | Facility: CLINIC | Age: 51
End: 2021-05-20
Payer: COMMERCIAL

## 2021-05-20 VITALS
BODY MASS INDEX: 38.36 KG/M2 | HEIGHT: 63 IN | WEIGHT: 216.5 LBS | SYSTOLIC BLOOD PRESSURE: 128 MMHG | DIASTOLIC BLOOD PRESSURE: 70 MMHG

## 2021-05-20 DIAGNOSIS — N83.202 LEFT OVARIAN CYST: Primary | ICD-10-CM

## 2021-05-20 PROCEDURE — 3008F PR BODY MASS INDEX (BMI) DOCUMENTED: ICD-10-PCS | Mod: CPTII,S$GLB,, | Performed by: OBSTETRICS & GYNECOLOGY

## 2021-05-20 PROCEDURE — 1126F PR PAIN SEVERITY QUANTIFIED, NO PAIN PRESENT: ICD-10-PCS | Mod: S$GLB,,, | Performed by: OBSTETRICS & GYNECOLOGY

## 2021-05-20 PROCEDURE — 99999 PR PBB SHADOW E&M-EST. PATIENT-LVL IV: ICD-10-PCS | Mod: PBBFAC,,, | Performed by: OBSTETRICS & GYNECOLOGY

## 2021-05-20 PROCEDURE — 3008F BODY MASS INDEX DOCD: CPT | Mod: CPTII,S$GLB,, | Performed by: OBSTETRICS & GYNECOLOGY

## 2021-05-20 PROCEDURE — 99999 PR PBB SHADOW E&M-EST. PATIENT-LVL IV: CPT | Mod: PBBFAC,,, | Performed by: OBSTETRICS & GYNECOLOGY

## 2021-05-20 PROCEDURE — 1126F AMNT PAIN NOTED NONE PRSNT: CPT | Mod: S$GLB,,, | Performed by: OBSTETRICS & GYNECOLOGY

## 2021-05-20 PROCEDURE — 99212 OFFICE O/P EST SF 10 MIN: CPT | Mod: S$GLB,,, | Performed by: OBSTETRICS & GYNECOLOGY

## 2021-05-20 PROCEDURE — 99212 PR OFFICE/OUTPT VISIT, EST, LEVL II, 10-19 MIN: ICD-10-PCS | Mod: S$GLB,,, | Performed by: OBSTETRICS & GYNECOLOGY

## 2021-05-20 RX ORDER — SEMAGLUTIDE 1.34 MG/ML
INJECTION, SOLUTION SUBCUTANEOUS
COMMUNITY
Start: 2021-05-13

## 2021-12-13 ENCOUNTER — HOSPITAL ENCOUNTER (OUTPATIENT)
Dept: RADIOLOGY | Facility: HOSPITAL | Age: 51
Discharge: HOME | End: 2021-12-13
Attending: OBSTETRICS & GYNECOLOGY
Payer: COMMERCIAL

## 2021-12-13 DIAGNOSIS — N83.202 LEFT OVARIAN CYST: ICD-10-CM

## 2021-12-13 PROCEDURE — 76857 US EXAM PELVIC LIMITED: CPT | Mod: TC

## 2021-12-13 PROCEDURE — 76857 US PELVIS LIMITED NON OB: ICD-10-PCS | Mod: 26,,, | Performed by: RADIOLOGY

## 2021-12-13 PROCEDURE — 76857 US EXAM PELVIC LIMITED: CPT | Mod: 26,,, | Performed by: RADIOLOGY

## 2022-03-23 ENCOUNTER — OFFICE VISIT (OUTPATIENT)
Dept: DERMATOLOGY | Facility: CLINIC | Age: 52
End: 2022-03-23
Payer: COMMERCIAL

## 2022-03-23 DIAGNOSIS — B07.8 OTHER VIRAL WARTS: Primary | ICD-10-CM

## 2022-03-23 PROCEDURE — 1159F MED LIST DOCD IN RCRD: CPT | Mod: CPTII,S$GLB,, | Performed by: STUDENT IN AN ORGANIZED HEALTH CARE EDUCATION/TRAINING PROGRAM

## 2022-03-23 PROCEDURE — 99214 OFFICE O/P EST MOD 30 MIN: CPT | Mod: 25,S$GLB,, | Performed by: STUDENT IN AN ORGANIZED HEALTH CARE EDUCATION/TRAINING PROGRAM

## 2022-03-23 PROCEDURE — 99999 PR PBB SHADOW E&M-EST. PATIENT-LVL III: CPT | Mod: PBBFAC,,, | Performed by: STUDENT IN AN ORGANIZED HEALTH CARE EDUCATION/TRAINING PROGRAM

## 2022-03-23 PROCEDURE — 17110 DESTRUCTION B9 LES UP TO 14: CPT | Mod: S$GLB,,, | Performed by: STUDENT IN AN ORGANIZED HEALTH CARE EDUCATION/TRAINING PROGRAM

## 2022-03-23 PROCEDURE — 1160F PR REVIEW ALL MEDS BY PRESCRIBER/CLIN PHARMACIST DOCUMENTED: ICD-10-PCS | Mod: CPTII,S$GLB,, | Performed by: STUDENT IN AN ORGANIZED HEALTH CARE EDUCATION/TRAINING PROGRAM

## 2022-03-23 PROCEDURE — 17110 PR DESTRUCTION BENIGN LESIONS UP TO 14: ICD-10-PCS | Mod: S$GLB,,, | Performed by: STUDENT IN AN ORGANIZED HEALTH CARE EDUCATION/TRAINING PROGRAM

## 2022-03-23 PROCEDURE — 1159F PR MEDICATION LIST DOCUMENTED IN MEDICAL RECORD: ICD-10-PCS | Mod: CPTII,S$GLB,, | Performed by: STUDENT IN AN ORGANIZED HEALTH CARE EDUCATION/TRAINING PROGRAM

## 2022-03-23 PROCEDURE — 4010F PR ACE/ARB THEARPY RXD/TAKEN: ICD-10-PCS | Mod: CPTII,S$GLB,, | Performed by: STUDENT IN AN ORGANIZED HEALTH CARE EDUCATION/TRAINING PROGRAM

## 2022-03-23 PROCEDURE — 99214 PR OFFICE/OUTPT VISIT, EST, LEVL IV, 30-39 MIN: ICD-10-PCS | Mod: 25,S$GLB,, | Performed by: STUDENT IN AN ORGANIZED HEALTH CARE EDUCATION/TRAINING PROGRAM

## 2022-03-23 PROCEDURE — 4010F ACE/ARB THERAPY RXD/TAKEN: CPT | Mod: CPTII,S$GLB,, | Performed by: STUDENT IN AN ORGANIZED HEALTH CARE EDUCATION/TRAINING PROGRAM

## 2022-03-23 PROCEDURE — 99999 PR PBB SHADOW E&M-EST. PATIENT-LVL III: ICD-10-PCS | Mod: PBBFAC,,, | Performed by: STUDENT IN AN ORGANIZED HEALTH CARE EDUCATION/TRAINING PROGRAM

## 2022-03-23 PROCEDURE — 1160F RVW MEDS BY RX/DR IN RCRD: CPT | Mod: CPTII,S$GLB,, | Performed by: STUDENT IN AN ORGANIZED HEALTH CARE EDUCATION/TRAINING PROGRAM

## 2022-03-23 RX ORDER — IMIQUIMOD 12.5 MG/.25G
CREAM TOPICAL
Qty: 24 PACKET | Refills: 1 | Status: SHIPPED | OUTPATIENT
Start: 2022-03-23

## 2022-03-23 NOTE — PROGRESS NOTES
Subjective:       Patient ID:  Ania Liao is a 51 y.o. female who presents for   Chief Complaint   Patient presents with    Warts     Wart on foot      History of Present Illness: The patient presents with chief complaint of warts/plantar warts.  Location: sole of the left foot  Duration: progressive over many months  Signs/Symptoms: spreading warts  Prior treatments: OTC wart remover, OTC cryotherapy no improvement in symptoms.         Review of Systems   Constitutional: Negative for fever and chills.   Skin: Negative for itching, rash and dry skin.        Objective:    Physical Exam   Constitutional: She appears well-developed and well-nourished. No distress.   Neurological: She is alert and oriented to person, place, and time. She is not disoriented.   Psychiatric: She has a normal mood and affect.   Skin:   Areas Examined (abnormalities noted in diagram):   Nails and Digits Inspection Performed             Diagram Legend     Erythematous scaling macule/papule c/w actinic keratosis       Vascular papule c/w angioma      Pigmented verrucoid papule/plaque c/w seborrheic keratosis      Yellow umbilicated papule c/w sebaceous hyperplasia      Irregularly shaped tan macule c/w lentigo     1-2 mm smooth white papules consistent with Milia      Movable subcutaneous cyst with punctum c/w epidermal inclusion cyst      Subcutaneous movable cyst c/w pilar cyst      Firm pink to brown papule c/w dermatofibroma      Pedunculated fleshy papule(s) c/w skin tag(s)      Evenly pigmented macule c/w junctional nevus     Mildly variegated pigmented, slightly irregular-bordered macule c/w mildly atypical nevus      Flesh colored to evenly pigmented papule c/w intradermal nevus       Pink pearly papule/plaque c/w basal cell carcinoma      Erythematous hyperkeratotic cursted plaque c/w SCC      Surgical scar with no sign of skin cancer recurrence      Open and closed comedones      Inflammatory papules and pustules       Verrucoid papule consistent consistent with wart     Erythematous eczematous patches and plaques     Dystrophic onycholytic nail with subungual debris c/w onychomycosis     Umbilicated papule    Erythematous-base heme-crusted tan verrucoid plaque consistent with inflamed seborrheic keratosis     Erythematous Silvery Scaling Plaque c/w Psoriasis     See annotation      Assessment / Plan:        Other viral warts - plantar warts on the left foot. Discussed treatment options, including cryotherapy and aldara. Will proceed with treatment with both.   -     imiquimod (ALDARA) 5 % cream; Apply topically 3 (three) times a week. Leave on for 8 hours, then rinse off.  Dispense: 24 packet; Refill: 1    Cryosurgery procedure note:    Verbal consent from the patient is obtained including, but not limited to, risk of hypopigmentation/hyperpigmentation, scar, recurrence of lesion. Liquid nitrogen cryosurgery is applied to 14 verruca with prior paring, as detailed in the physical exam, to produce a freeze injury. 2 consecutive freeze thaw cycles are applied to each verruca. The patient is aware that blisters (possibly blood blisters) may form.             Follow up in about 8 weeks (around 5/18/2022).

## 2023-08-18 ENCOUNTER — IMMUNIZATION (OUTPATIENT)
Dept: PHARMACY | Facility: CLINIC | Age: 53
End: 2023-08-18
Payer: COMMERCIAL

## 2023-08-18 DIAGNOSIS — Z23 NEED FOR VACCINATION: Primary | ICD-10-CM
